# Patient Record
Sex: FEMALE | Race: WHITE | URBAN - METROPOLITAN AREA
[De-identification: names, ages, dates, MRNs, and addresses within clinical notes are randomized per-mention and may not be internally consistent; named-entity substitution may affect disease eponyms.]

---

## 2019-02-27 ENCOUNTER — OFFICE VISIT - RIVER FALLS (OUTPATIENT)
Dept: FAMILY MEDICINE | Facility: CLINIC | Age: 84
End: 2019-02-27

## 2021-05-25 ENCOUNTER — RECORDS - HEALTHEAST (OUTPATIENT)
Dept: ADMINISTRATIVE | Facility: CLINIC | Age: 86
End: 2021-05-25

## 2022-02-11 VITALS — WEIGHT: 172.2 LBS | SYSTOLIC BLOOD PRESSURE: 126 MMHG | HEART RATE: 64 BPM | DIASTOLIC BLOOD PRESSURE: 68 MMHG

## 2022-02-16 NOTE — PROGRESS NOTES
Patient:   NIRAV CASTANO            MRN: [id number]            FIN: [id number]               Age:   85 years     Sex:  Female     :  10/25/1933   Associated Diagnoses:   Nail fungus; Type 2 diabetes mellitus   Author:   Babatunde Bailey MD      Chief Complaint   2019 10:32 AM CST   c/o painful long  toenails      History of Present Illness   see chief complaint as noted above and confirmed with the patient   85 year old patient that is new the clinic today. She recently moved to [place] from [facility] to be closer to her daughter. She states that her toenails are long and very painful. She's struggling to walk due to the pain. She checked into a few nail salons close to where she lives to see if they would trim her nails but they declined due to her being Diabetic.        Review of Systems   Constitutional:  No fever, No chills, No fatigue.    Ear/Nose/Mouth/Throat:  No nasal congestion, No sore throat.    Respiratory:  No shortness of breath, No cough.    Cardiovascular:  No chest pain.    Gastrointestinal:  No nausea, No vomiting.    Integumentary:  nail fungus, No rash.    Neurologic:  No headache.    Psychiatric:  Negative.              Health Status   Allergies:    Allergic Reactions (Selected)  Severity Not Documented  Cipro (No reactions were documented)  Flagyl (No reactions were documented)   Medications:  (Selected)   Documented Medications  Documented  Multi Vitamin+: 0 Refill(s), Type: Maintenance  Ocuvite: Oral, daily, 0 Refill(s), Type: Maintenance  Stool Softener with Laxative: Oral, hs, 0 Refill(s), Type: Maintenance  allopurinol 100 mg oral tablet: = 1 tab(s) ( 100 mg ), Oral, bid, 0 Refill(s), Type: Maintenance  amiodarone: ( 50 mg ), Oral, daily, 0 Refill(s), Type: Maintenance  aspirin 81 mg oral delayed release tablet: = 1 tab(s) ( 81 mg ), Oral, daily, 0 Refill(s), Type: Maintenance  atorvastatin 40 mg oral tablet: = 1 tab(s) ( 40 mg ), Oral, daily, 0 Refill(s), Type: Maintenance  calcium  (as calcium citrate) 250 mg oral tablet: = 1 tab(s) ( 250 mg ), Oral, bid, 0 Refill(s), Type: Maintenance  dilTIAZem 180 mg/24 hours oral capsule, extended release: = 1 cap(s) ( 180 mg ), Oral, daily, 0 Refill(s), Type: Maintenance  furosemide 40 mg oral tablet: = 1 tab(s) ( 40 mg ), Oral, daily, 0 Refill(s), Type: Maintenance  glimepiride 2 mg oral tablet: = 1 tab(s) ( 2 mg ), Oral, daily, 0 Refill(s), Type: Maintenance  warfarin 2 mg oral tablet: = 1 tab(s) ( 2 mg ), Oral, daily, 0 Refill(s), Type: Maintenance   Problem list:    No problem items selected or recorded.      Histories   Past Medical History:    No active or resolved past medical history items have been selected or recorded.   Family History:    No family history items have been selected or recorded.   Procedure history:    No active procedure history items have been selected or recorded.   Social History:             No active social history items have been recorded.      Physical Examination   Vital Signs   2/27/2019 10:32 AM CST Peripheral Pulse Rate 64 bpm    Systolic Blood Pressure 126 mmHg    Diastolic Blood Pressure 68 mmHg    Mean Arterial Pressure 87 mmHg      Measurements from flowsheet : Measurements   2/27/2019 10:32 AM CST   Weight Measured - Standard                172.2 lb     General:  Alert and oriented, No acute distress.    Eye:  Pupils are equal, round and reactive to light, Normal conjunctiva.    HENT:  Oral mucosa is moist.    Neck:  Supple.    Respiratory:  Respirations are non-labored.    Cardiovascular:  Normal rate, Regular rhythm, No edema.    Gastrointestinal:  Non-distended.    Musculoskeletal:  Normal gait.    Integumentary:  Warm, No rash.    Psychiatric:  Cooperative, Appropriate mood & affect, Normal judgment.       Review / Management   Results review      Impression and Plan   Diagnosis     Nail fungus (KTC31-FF B35.1).     Type 2 diabetes mellitus (VRP59-ZZ E11.9).     Plan:  Discussed that they have a fungal  infection that is causing them to be thick.All nails on both feet where clipped. She was able to walk  better and without pain afterwards. Reviewed expected course, what to watch for, and when to return.   I, Brit Knight CMA, acted solely as a scribe for, and in the presence of Dr. Babatunde Bailey who performed the service..

## 2022-02-16 NOTE — NURSING NOTE
Comprehensive Intake Entered On:  2/27/2019 10:40 AM CST    Performed On:  2/27/2019 10:32 AM CST by Saundra Knight CMA               Summary   Chief Complaint :   c/o painful long  toenails   Weight Measured :   172.2 lb(Converted to: 172 lb 3 oz, 78.11 kg)    Systolic Blood Pressure :   126 mmHg   Diastolic Blood Pressure :   68 mmHg   Mean Arterial Pressure :   87 mmHg   Peripheral Pulse Rate :   64 bpm   Saundra Knight CMA - 2/27/2019 10:32 AM CST   Health Status   Allergies Verified? :   Yes   Medication History Verified? :   Yes   Pre-Visit Planning Status :   Completed   Saundra Knight CMA - 2/27/2019 10:32 AM CST   Consents   Consent for Immunization Exchange :   Consent Granted   Consent for Immunizations to Providers :   Consent Granted   Saundra Knight CMA - 2/27/2019 10:32 AM CST   Meds / Allergies   (As Of: 2/27/2019 10:40:35 AM CST)   Allergies (Active)   Cipro  Estimated Onset Date:   Unspecified ; Created By:   Saundra Knight CMA; Reaction Status:   Active ; Category:   Drug ; Substance:   Cipro ; Type:   Allergy ; Updated By:   Saundra Knight CMA; Reviewed Date:   2/27/2019 10:34 AM CST      Flagyl  Estimated Onset Date:   Unspecified ; Created By:   Saundra Knight CMA; Reaction Status:   Active ; Category:   Drug ; Substance:   Flagyl ; Type:   Allergy ; Updated By:   Saundra Knight CMA; Reviewed Date:   2/27/2019 10:34 AM CST        Medication List   (As Of: 2/27/2019 10:40:35 AM CST)   Home Meds    allopurinol  :   allopurinol ; Status:   Documented ; Ordered As Mnemonic:   allopurinol 100 mg oral tablet ; Simple Display Line:   100 mg, 1 tab(s), Oral, bid, 0 Refill(s) ; Catalog Code:   allopurinol ; Order Dt/Tm:   2/27/2019 10:37:38 AM          amiodarone  :   amiodarone ; Status:   Documented ; Ordered As Mnemonic:   amiodarone ; Simple Display Line:   50 mg, Oral, daily, 0 Refill(s) ; Catalog Code:   amiodarone ; Order Dt/Tm:   2/27/2019 10:38:09  AM          aspirin  :   aspirin ; Status:   Documented ; Ordered As Mnemonic:   aspirin 81 mg oral delayed release tablet ; Simple Display Line:   81 mg, 1 tab(s), Oral, daily, 0 Refill(s) ; Catalog Code:   aspirin ; Order Dt/Tm:   2/27/2019 10:35:26 AM          atorvastatin  :   atorvastatin ; Status:   Documented ; Ordered As Mnemonic:   atorvastatin 40 mg oral tablet ; Simple Display Line:   40 mg, 1 tab(s), Oral, daily, 0 Refill(s) ; Catalog Code:   atorvastatin ; Order Dt/Tm:   2/27/2019 10:37:57 AM          calcium citrate  :   calcium citrate ; Status:   Documented ; Ordered As Mnemonic:   calcium (as calcium citrate) 250 mg oral tablet ; Simple Display Line:   250 mg, 1 tab(s), Oral, bid, 0 Refill(s) ; Catalog Code:   calcium citrate ; Order Dt/Tm:   2/27/2019 10:38:30 AM          dilTIAZem  :   dilTIAZem ; Status:   Documented ; Ordered As Mnemonic:   dilTIAZem 180 mg/24 hours oral capsule, extended release ; Simple Display Line:   180 mg, 1 cap(s), Oral, daily, 0 Refill(s) ; Catalog Code:   dilTIAZem ; Order Dt/Tm:   2/27/2019 10:37:05 AM          docusate-senna  :   docusate-senna ; Status:   Documented ; Ordered As Mnemonic:   Stool Softener with Laxative ; Simple Display Line:   Oral, hs, 0 Refill(s) ; Catalog Code:   docusate-senna ; Order Dt/Tm:   2/27/2019 10:38:23 AM          furosemide  :   furosemide ; Status:   Documented ; Ordered As Mnemonic:   furosemide 40 mg oral tablet ; Simple Display Line:   40 mg, 1 tab(s), Oral, daily, 0 Refill(s) ; Catalog Code:   furosemide ; Order Dt/Tm:   2/27/2019 10:36:07 AM          glimepiride  :   glimepiride ; Status:   Documented ; Ordered As Mnemonic:   glimepiride 2 mg oral tablet ; Simple Display Line:   2 mg, 1 tab(s), Oral, daily, 0 Refill(s) ; Catalog Code:   glimepiride ; Order Dt/Tm:   2/27/2019 10:35:42 AM          multivitamin  :   multivitamin ; Status:   Documented ; Ordered As Mnemonic:   Multi Vitamin+ ; Simple Display Line:   0 Refill(s) ;  Catalog Code:   multivitamin ; Order Dt/Tm:   2/27/2019 10:38:43 AM          multivitamin with minerals  :   multivitamin with minerals ; Status:   Documented ; Ordered As Mnemonic:   Ocuvite ; Simple Display Line:   Oral, daily, 0 Refill(s) ; Catalog Code:   multivitamin with minerals ; Order Dt/Tm:   2/27/2019 10:38:15 AM          warfarin  :   warfarin ; Status:   Documented ; Ordered As Mnemonic:   warfarin 2 mg oral tablet ; Simple Display Line:   2 mg, 1 tab(s), Oral, daily, 0 Refill(s) ; Catalog Code:   warfarin ; Order Dt/Tm:   2/27/2019 10:37:27 AM

## 2022-02-16 NOTE — NURSING NOTE
Depression Screening Entered On:  2/27/2019 12:13 PM CST    Performed On:  2/27/2019 12:12 PM CST by Saundra Knight CMA               Depression Screening   Feeling Down, Depressed, Hopeless :   Not at all   Little Interest - Pleasure in Activities :   Not at all   Initial Depression Screen Score :   0    Trouble Falling or Staying Asleep :   Several days   Feeling Tired or Little Energy :   Not at all   Poor Appetite or Overeating :   Not at all   Feeling Bad About Yourself :   Not at all   Trouble Concentrating :   Not at all   Moving or Speaking Slowly :   Not at all   Thoughts Better Off Dead or Hurting Self :   Not at all   Detailed Depression Screen Score :   1    Total Depression Screen Score :   1    GAEL Difficulty with Work, Home, Others :   Not difficult at all   Saundra Knight CMA - 2/27/2019 12:12 PM CST

## 2022-12-07 ENCOUNTER — LAB REQUISITION (OUTPATIENT)
Dept: LAB | Facility: CLINIC | Age: 87
End: 2022-12-07
Payer: MEDICARE

## 2022-12-07 DIAGNOSIS — I48.91 UNSPECIFIED ATRIAL FIBRILLATION (H): ICD-10-CM

## 2022-12-08 LAB — INR PPP: 2.23 (ref 0.85–1.15)

## 2022-12-08 PROCEDURE — 85610 PROTHROMBIN TIME: CPT | Mod: ORL | Performed by: FAMILY MEDICINE

## 2022-12-08 PROCEDURE — 36415 COLL VENOUS BLD VENIPUNCTURE: CPT | Mod: ORL | Performed by: FAMILY MEDICINE

## 2022-12-08 PROCEDURE — P9603 ONE-WAY ALLOW PRORATED MILES: HCPCS | Mod: ORL | Performed by: FAMILY MEDICINE

## 2022-12-21 ENCOUNTER — LAB REQUISITION (OUTPATIENT)
Dept: LAB | Facility: CLINIC | Age: 87
End: 2022-12-21
Payer: MEDICARE

## 2022-12-21 DIAGNOSIS — I48.91 UNSPECIFIED ATRIAL FIBRILLATION (H): ICD-10-CM

## 2022-12-23 LAB — INR PPP: 2.9 (ref 0.85–1.15)

## 2022-12-23 PROCEDURE — 85610 PROTHROMBIN TIME: CPT | Mod: ORL | Performed by: FAMILY MEDICINE

## 2022-12-23 PROCEDURE — 36415 COLL VENOUS BLD VENIPUNCTURE: CPT | Mod: ORL | Performed by: FAMILY MEDICINE

## 2022-12-23 PROCEDURE — P9603 ONE-WAY ALLOW PRORATED MILES: HCPCS | Mod: ORL | Performed by: FAMILY MEDICINE
